# Patient Record
Sex: MALE | Race: WHITE | Employment: FULL TIME | URBAN - METROPOLITAN AREA
[De-identification: names, ages, dates, MRNs, and addresses within clinical notes are randomized per-mention and may not be internally consistent; named-entity substitution may affect disease eponyms.]

---

## 2024-11-14 ENCOUNTER — OFFICE VISIT (OUTPATIENT)
Dept: GASTROENTEROLOGY | Facility: CLINIC | Age: 59
End: 2024-11-14
Payer: COMMERCIAL

## 2024-11-14 ENCOUNTER — TELEPHONE (OUTPATIENT)
Dept: GASTROENTEROLOGY | Facility: CLINIC | Age: 59
End: 2024-11-14

## 2024-11-14 VITALS
SYSTOLIC BLOOD PRESSURE: 138 MMHG | HEIGHT: 74 IN | DIASTOLIC BLOOD PRESSURE: 84 MMHG | BODY MASS INDEX: 27.59 KG/M2 | WEIGHT: 215 LBS

## 2024-11-14 DIAGNOSIS — Z80.0 FAMILY HISTORY OF ESOPHAGEAL CANCER: ICD-10-CM

## 2024-11-14 DIAGNOSIS — Z12.11 SCREENING FOR COLORECTAL CANCER: ICD-10-CM

## 2024-11-14 DIAGNOSIS — K21.9 GASTROESOPHAGEAL REFLUX DISEASE, UNSPECIFIED WHETHER ESOPHAGITIS PRESENT: Primary | ICD-10-CM

## 2024-11-14 DIAGNOSIS — Z12.12 SCREENING FOR COLORECTAL CANCER: ICD-10-CM

## 2024-11-14 PROCEDURE — 99203 OFFICE O/P NEW LOW 30 MIN: CPT | Performed by: INTERNAL MEDICINE

## 2024-11-14 RX ORDER — SODIUM CHLORIDE, SODIUM LACTATE, POTASSIUM CHLORIDE, CALCIUM CHLORIDE 600; 310; 30; 20 MG/100ML; MG/100ML; MG/100ML; MG/100ML
125 INJECTION, SOLUTION INTRAVENOUS CONTINUOUS
OUTPATIENT
Start: 2024-11-14

## 2024-11-14 NOTE — PROGRESS NOTES
[facility] - Outpatient Consultation  Chris Cabral 59 y.o. male MRN: [id number]  Encounter: [id number]    ASSESSMENT AND PLAN:    Other orders    lactated ringers infusion    1. Gastroesophageal reflux disease, unspecified whether esophagitis present  -     EGD; Future; Expected date: 11/14/2024  2. Family history of esophageal cancer  -     EGD; Future; Expected date: 11/14/2024  3. Screening for colorectal cancer     Assessment & Plan  1. Gastroesophageal Reflux Disease (GERD).  He has a long history of reflux and a family history of esophageal cancer. He reports sporadic symptoms of heartburn but no regular nausea, vomiting, abdominal pain, changes in bowel movements, or blood in the stool. He occasionally takes Advil for muscle cramps but not on a regular basis. An endoscopy is recommended to assess the extent of his reflux and to rule out any potential complications, including Huber's esophagus. The procedure is scheduled for 12/05/2024. The risks of the procedure, including bleeding, infection, and perforation, have been discussed.      ---    Chief Complaint   Patient presents with    EGD Consult     Pt has reflux symptoms specially at night, would like to discuss screening EGD.  Father passed from colon cancer.       HPI:   Chris Cabral is a 59 y.o. male presenting to [facility] care.   History of Present Illness  The patient is a 59-year-old male referred by NIXON Elias, for GERD.    He has been experiencing reflux since 2001, which he managed with Pepcid. His symptoms are sporadic and not frequent. He reports a weight loss of 30 pounds and a drop in blood pressure. He does not experience regular nausea, vomiting, or abdominal pain. There have been no changes in his bowel movements, and he reports no presence of blood or black stools. He has never undergone an endoscopy.    He has a long history of heartburn and is a former smoker. He uses Advil for muscle cramps  "during physical exertion, such as walking 18 holes of golf, but does not take Advil daily.    His last colonoscopy was in 2020 with a recommended recall in 10 years. He has a family history of esophageal cancer in his father.    FAMILY HISTORY  His father had esophageal cancer.    Historical Information   History reviewed. No pertinent past medical history.  Past Surgical History:   Procedure Laterality Date    COLONOSCOPY  2020     Social History     Substance and Sexual Activity   Alcohol Use Not Currently     Social History     Substance and Sexual Activity   Drug Use Never     Social History     Tobacco Use   Smoking Status Former    Types: Cigarettes    Passive exposure: Past   Smokeless Tobacco Never     Family History   Problem Relation Age of Onset    No Known Problems Mother     Colon cancer Father        Meds/Allergies   No current outpatient medications on file.  No Known Allergies    PHYSICAL EXAM:    Blood pressure 138/84, height 6' 2\" (1.88 m), weight 97.5 kg (215 lb). Body mass index is 27.6 kg/m².  Physical Exam      General Appearance: No apparent distress, cooperative, alert.  Eyes: Anicteric.  Gastrointestinal: Soft, non-tender, non-distended; normal bowel sounds; no masses, no organomegaly.    Rectal: Deferred.  Musculoskeletal: No edema.  Skin: No jaundice.     OTHER LAB RESULTS:   No results found for: \"WBC\", \"HGB\", \"MCV\", \"PLT\", \"INR\"  No results found for: \"NA\", \"K\", \"CL\", \"CO2\", \"ANIONGAP\", \"BUN\", \"CREATININE\", \"GLUCOSE\", \"GLUF\", \"CALCIUM\", \"CORRECTEDCA\", \"AST\", \"ALT\", \"ALKPHOS\", \"ALB\", \"TBILI\", \"EGFR\"  No results found for: \"IRON\", \"TIBC\", \"FERRITIN\"  No results found for: \"LIPASE\"    OTHER RADIOLOGY RESULTS:   No results found.  "

## 2024-11-14 NOTE — LETTER
November 14, 2024     Mariann Birch NP  [address]  [place] [address]    Patient: Chris Cabral   YOB: 1965   Date of Visit: 11/14/2024       Dear Mariann Birch:    Thank you for referring Chris Cabral to me for evaluation. Below are my notes for this consultation.    If you have questions, please do not hesitate to call me. I look forward to following your patient along with you.         Sincerely,        Rodríguez Lomax MD        CC: No Recipients    Rodríguez Lomax MD  11/14/2024 10:17 AM  Sign when Signing Visit    [facility] - Outpatient Consultation  Chris Cabral 59 y.o. male MRN: [id number]  Encounter: [id number]    ASSESSMENT AND PLAN:    Other orders  •  lactated ringers infusion    1. Gastroesophageal reflux disease, unspecified whether esophagitis present  -     EGD; Future; Expected date: 11/14/2024  2. Family history of esophageal cancer  -     EGD; Future; Expected date: 11/14/2024  3. Screening for colorectal cancer     Assessment & Plan  1. Gastroesophageal Reflux Disease (GERD).  He has a long history of reflux and a family history of esophageal cancer. He reports sporadic symptoms of heartburn but no regular nausea, vomiting, abdominal pain, changes in bowel movements, or blood in the stool. He occasionally takes Advil for muscle cramps but not on a regular basis. An endoscopy is recommended to assess the extent of his reflux and to rule out any potential complications, including Huber's esophagus. The procedure is scheduled for 12/05/2024. The risks of the procedure, including bleeding, infection, and perforation, have been discussed.      ---    Chief Complaint   Patient presents with   • EGD Consult     Pt has reflux symptoms specially at night, would like to discuss screening EGD.  Father passed from colon cancer.       HPI:   Chris Cabral is a 59 y.o. male presenting to [facility] care.   History of Present Illness  The patient is a 59-year-old  "male referred by NIXON Elias, for GERD.    He has been experiencing reflux since 2001, which he managed with Pepcid. His symptoms are sporadic and not frequent. He reports a weight loss of 30 pounds and a drop in blood pressure. He does not experience regular nausea, vomiting, or abdominal pain. There have been no changes in his bowel movements, and he reports no presence of blood or black stools. He has never undergone an endoscopy.    He has a long history of heartburn and is a former smoker. He uses Advil for muscle cramps during physical exertion, such as walking 18 holes of golf, but does not take Advil daily.    His last colonoscopy was in 2020 with a recommended recall in 10 years. He has a family history of esophageal cancer in his father.    FAMILY HISTORY  His father had esophageal cancer.    Historical Information  History reviewed. No pertinent past medical history.  Past Surgical History:   Procedure Laterality Date   • COLONOSCOPY  2020     Social History     Substance and Sexual Activity   Alcohol Use Not Currently     Social History     Substance and Sexual Activity   Drug Use Never     Social History     Tobacco Use   Smoking Status Former   • Types: Cigarettes   • Passive exposure: Past   Smokeless Tobacco Never     Family History   Problem Relation Age of Onset   • No Known Problems Mother    • Colon cancer Father        Meds/Allergies  No current outpatient medications on file.  No Known Allergies    PHYSICAL EXAM:    Blood pressure 138/84, height 6' 2\" (1.88 m), weight 97.5 kg (215 lb). Body mass index is 27.6 kg/m².  Physical Exam      General Appearance: No apparent distress, cooperative, alert.  Eyes: Anicteric.  Gastrointestinal: Soft, non-tender, non-distended; normal bowel sounds; no masses, no organomegaly.    Rectal: Deferred.  Musculoskeletal: No edema.  Skin: No jaundice.     OTHER LAB RESULTS:   No results found for: \"WBC\", \"HGB\", \"MCV\", \"PLT\", \"INR\"  No results found for: " "\"NA\", \"K\", \"CL\", \"CO2\", \"ANIONGAP\", \"BUN\", \"CREATININE\", \"GLUCOSE\", \"GLUF\", \"CALCIUM\", \"CORRECTEDCA\", \"AST\", \"ALT\", \"ALKPHOS\", \"ALB\", \"TBILI\", \"EGFR\"  No results found for: \"IRON\", \"TIBC\", \"FERRITIN\"  No results found for: \"LIPASE\"    OTHER RADIOLOGY RESULTS:   No results found.  "

## 2024-11-14 NOTE — H&P (VIEW-ONLY)
[facility] - Outpatient Consultation  Chris Cabral 59 y.o. male MRN: [id number]  Encounter: [id number]    ASSESSMENT AND PLAN:    Other orders    lactated ringers infusion    1. Gastroesophageal reflux disease, unspecified whether esophagitis present  -     EGD; Future; Expected date: 11/14/2024  2. Family history of esophageal cancer  -     EGD; Future; Expected date: 11/14/2024  3. Screening for colorectal cancer     Assessment & Plan  1. Gastroesophageal Reflux Disease (GERD).  He has a long history of reflux and a family history of esophageal cancer. He reports sporadic symptoms of heartburn but no regular nausea, vomiting, abdominal pain, changes in bowel movements, or blood in the stool. He occasionally takes Advil for muscle cramps but not on a regular basis. An endoscopy is recommended to assess the extent of his reflux and to rule out any potential complications, including Huber's esophagus. The procedure is scheduled for 12/05/2024. The risks of the procedure, including bleeding, infection, and perforation, have been discussed.      ---    Chief Complaint   Patient presents with    EGD Consult     Pt has reflux symptoms specially at night, would like to discuss screening EGD.  Father passed from colon cancer.       HPI:   Chris Cabral is a 59 y.o. male presenting to [facility] care.   History of Present Illness  The patient is a 59-year-old male referred by NIXON Elias, for GERD.    He has been experiencing reflux since 2001, which he managed with Pepcid. His symptoms are sporadic and not frequent. He reports a weight loss of 30 pounds and a drop in blood pressure. He does not experience regular nausea, vomiting, or abdominal pain. There have been no changes in his bowel movements, and he reports no presence of blood or black stools. He has never undergone an endoscopy.    He has a long history of heartburn and is a former smoker. He uses Advil for muscle cramps  "during physical exertion, such as walking 18 holes of golf, but does not take Advil daily.    His last colonoscopy was in 2020 with a recommended recall in 10 years. He has a family history of esophageal cancer in his father.    FAMILY HISTORY  His father had esophageal cancer.    Historical Information   History reviewed. No pertinent past medical history.  Past Surgical History:   Procedure Laterality Date    COLONOSCOPY  2020     Social History     Substance and Sexual Activity   Alcohol Use Not Currently     Social History     Substance and Sexual Activity   Drug Use Never     Social History     Tobacco Use   Smoking Status Former    Types: Cigarettes    Passive exposure: Past   Smokeless Tobacco Never     Family History   Problem Relation Age of Onset    No Known Problems Mother     Colon cancer Father        Meds/Allergies   No current outpatient medications on file.  No Known Allergies    PHYSICAL EXAM:    Blood pressure 138/84, height 6' 2\" (1.88 m), weight 97.5 kg (215 lb). Body mass index is 27.6 kg/m².  Physical Exam      General Appearance: No apparent distress, cooperative, alert.  Eyes: Anicteric.  Gastrointestinal: Soft, non-tender, non-distended; normal bowel sounds; no masses, no organomegaly.    Rectal: Deferred.  Musculoskeletal: No edema.  Skin: No jaundice.     OTHER LAB RESULTS:   No results found for: \"WBC\", \"HGB\", \"MCV\", \"PLT\", \"INR\"  No results found for: \"NA\", \"K\", \"CL\", \"CO2\", \"ANIONGAP\", \"BUN\", \"CREATININE\", \"GLUCOSE\", \"GLUF\", \"CALCIUM\", \"CORRECTEDCA\", \"AST\", \"ALT\", \"ALKPHOS\", \"ALB\", \"TBILI\", \"EGFR\"  No results found for: \"IRON\", \"TIBC\", \"FERRITIN\"  No results found for: \"LIPASE\"    OTHER RADIOLOGY RESULTS:   No results found.  "

## 2024-11-14 NOTE — TELEPHONE ENCOUNTER
Scheduled date of EGD(as of today):  12-5-24  Physician performing EGD:Dami  Location of EGD:[facility]  Instructions reviewed with patient by:nevaeh  Clearances: no

## 2024-11-21 ENCOUNTER — ANESTHESIA EVENT (OUTPATIENT)
Dept: ANESTHESIOLOGY | Facility: AMBULATORY SURGERY CENTER | Age: 59
End: 2024-11-21

## 2024-11-21 ENCOUNTER — ANESTHESIA (OUTPATIENT)
Dept: ANESTHESIOLOGY | Facility: AMBULATORY SURGERY CENTER | Age: 59
End: 2024-11-21

## 2024-11-27 ENCOUNTER — TELEPHONE (OUTPATIENT)
Dept: GASTROENTEROLOGY | Facility: CLINIC | Age: 59
End: 2024-11-27

## 2024-12-04 ENCOUNTER — TELEPHONE (OUTPATIENT)
Dept: OTHER | Facility: OTHER | Age: 59
End: 2024-12-04

## 2024-12-05 ENCOUNTER — ANESTHESIA EVENT (OUTPATIENT)
Dept: GASTROENTEROLOGY | Facility: AMBULATORY SURGERY CENTER | Age: 59
End: 2024-12-05

## 2024-12-05 ENCOUNTER — HOSPITAL ENCOUNTER (OUTPATIENT)
Dept: GASTROENTEROLOGY | Facility: AMBULATORY SURGERY CENTER | Age: 59
Discharge: HOME | End: 2024-12-05
Attending: INTERNAL MEDICINE
Payer: COMMERCIAL

## 2024-12-05 ENCOUNTER — ANESTHESIA (OUTPATIENT)
Dept: GASTROENTEROLOGY | Facility: AMBULATORY SURGERY CENTER | Age: 59
End: 2024-12-05

## 2024-12-05 VITALS
RESPIRATION RATE: 20 BRPM | HEIGHT: 74 IN | HEART RATE: 63 BPM | BODY MASS INDEX: 26.31 KG/M2 | OXYGEN SATURATION: 97 % | DIASTOLIC BLOOD PRESSURE: 85 MMHG | TEMPERATURE: 97.7 F | SYSTOLIC BLOOD PRESSURE: 128 MMHG | WEIGHT: 205 LBS

## 2024-12-05 DIAGNOSIS — Z80.0 FAMILY HISTORY OF ESOPHAGEAL CANCER: ICD-10-CM

## 2024-12-05 DIAGNOSIS — K21.9 GASTROESOPHAGEAL REFLUX DISEASE, UNSPECIFIED WHETHER ESOPHAGITIS PRESENT: ICD-10-CM

## 2024-12-05 PROCEDURE — 88305 TISSUE EXAM BY PATHOLOGIST: CPT | Performed by: SPECIALIST

## 2024-12-05 PROCEDURE — 43239 EGD BIOPSY SINGLE/MULTIPLE: CPT | Performed by: INTERNAL MEDICINE

## 2024-12-05 RX ORDER — PROPOFOL 10 MG/ML
INJECTION, EMULSION INTRAVENOUS AS NEEDED
Status: DISCONTINUED | OUTPATIENT
Start: 2024-12-05 | End: 2024-12-05

## 2024-12-05 RX ORDER — PROPOFOL 10 MG/ML
INJECTION, EMULSION INTRAVENOUS CONTINUOUS PRN
Status: DISCONTINUED | OUTPATIENT
Start: 2024-12-05 | End: 2024-12-05

## 2024-12-05 RX ORDER — SODIUM CHLORIDE, SODIUM LACTATE, POTASSIUM CHLORIDE, CALCIUM CHLORIDE 600; 310; 30; 20 MG/100ML; MG/100ML; MG/100ML; MG/100ML
125 INJECTION, SOLUTION INTRAVENOUS CONTINUOUS
Status: DISCONTINUED | OUTPATIENT
Start: 2024-12-05 | End: 2024-12-05

## 2024-12-05 RX ORDER — LIDOCAINE HYDROCHLORIDE 10 MG/ML
INJECTION, SOLUTION EPIDURAL; INFILTRATION; INTRACAUDAL; PERINEURAL AS NEEDED
Status: DISCONTINUED | OUTPATIENT
Start: 2024-12-05 | End: 2024-12-05

## 2024-12-05 RX ADMIN — PROPOFOL 130 MG: 10 INJECTION, EMULSION INTRAVENOUS at 10:58

## 2024-12-05 RX ADMIN — SODIUM CHLORIDE, SODIUM LACTATE, POTASSIUM CHLORIDE, CALCIUM CHLORIDE 125 ML/HR: 600; 310; 30; 20 INJECTION, SOLUTION INTRAVENOUS at 10:50

## 2024-12-05 RX ADMIN — PROPOFOL 140 MCG/KG/MIN: 10 INJECTION, EMULSION INTRAVENOUS at 10:58

## 2024-12-05 RX ADMIN — LIDOCAINE HYDROCHLORIDE 100 MG: 10 INJECTION, SOLUTION EPIDURAL; INFILTRATION; INTRACAUDAL; PERINEURAL at 10:58

## 2024-12-05 NOTE — TELEPHONE ENCOUNTER
Patient was calling to verify procedure time for GI procedure. Patient given arrival time and  confirmed understand. No call back necessary.

## 2024-12-05 NOTE — ANESTHESIA PREPROCEDURE EVALUATION
Procedure:  EGD    Relevant Problems   ANESTHESIA (within normal limits)      CARDIO (within normal limits)      GI/HEPATIC   (+) Gastroesophageal reflux disease      PULMONARY (within normal limits)   (-) Sleep apnea   (-) Smoking   (-) URI (upper respiratory infection)      Physical Exam    Airway    Mallampati score: I  TM Distance: >3 FB  Neck ROM: full     Dental   No notable dental hx     Cardiovascular      Pulmonary      Other Findings        Anesthesia Plan  ASA Score- 1     Anesthesia Type- IV sedation with anesthesia with ASA Monitors.         Additional Monitors:     Airway Plan:            Plan Factors-Exercise tolerance (METS): >4 METS.    Chart reviewed.   Existing labs reviewed. Patient summary reviewed.    Patient is not a current smoker.              Induction- intravenous.    Postoperative Plan-         Informed Consent- Anesthetic plan and risks discussed with patient.  I personally reviewed this patient with the CRNA. Discussed and agreed on the Anesthesia Plan with the CRNA..

## 2024-12-05 NOTE — ANESTHESIA POSTPROCEDURE EVALUATION
Post-Op Assessment Note    CV Status:  Stable  Pain Score: 0    Pain management: adequate       Mental Status:  Awake and alert   Hydration Status:  Stable   PONV Controlled:  None   Airway Patency:  Patent     Post Op Vitals Reviewed: Yes    No anethesia notable event occurred.    Staff: CRNA           Last Filed PACU Vitals:  Vitals Value Taken Time   Temp     Pulse  75    /83    Resp 15    SpO2 98%        Modified Asif:  Activity: 2 (12/5/2024 10:42 AM)  Respiration: 2 (12/5/2024 10:42 AM)  Circulation: 2 (12/5/2024 10:42 AM)  Consciousness: 2 (12/5/2024 10:42 AM)  Oxygen Saturation: 2 (12/5/2024 10:42 AM)  Modified Asif Score: 10 (12/5/2024 10:42 AM)

## 2024-12-05 NOTE — INTERVAL H&P NOTE
H&P reviewed. After examining the patient I find no changes in the patients condition since the H&P had been written.    Vitals:    12/05/24 1041   BP: (!) 165/101   Pulse: 71   Resp: 17   Temp:    SpO2: 98%

## 2024-12-09 PROCEDURE — 88305 TISSUE EXAM BY PATHOLOGIST: CPT | Performed by: SPECIALIST

## 2024-12-12 ENCOUNTER — RESULTS FOLLOW-UP (OUTPATIENT)
Dept: GASTROENTEROLOGY | Facility: CLINIC | Age: 59
End: 2024-12-12